# Patient Record
Sex: FEMALE | Race: WHITE | NOT HISPANIC OR LATINO | URBAN - METROPOLITAN AREA
[De-identification: names, ages, dates, MRNs, and addresses within clinical notes are randomized per-mention and may not be internally consistent; named-entity substitution may affect disease eponyms.]

---

## 2022-01-03 ENCOUNTER — LAB OUTSIDE AN ENCOUNTER (OUTPATIENT)
Dept: URBAN - METROPOLITAN AREA TELEHEALTH 2 | Facility: TELEHEALTH | Age: 52
End: 2022-01-03

## 2022-01-03 ENCOUNTER — OFFICE VISIT (OUTPATIENT)
Dept: URBAN - METROPOLITAN AREA TELEHEALTH 2 | Facility: TELEHEALTH | Age: 52
End: 2022-01-03
Payer: COMMERCIAL

## 2022-01-03 DIAGNOSIS — Z87.19 HISTORY OF ACUTE PANCREATITIS: ICD-10-CM

## 2022-01-03 DIAGNOSIS — N13.30 HYDRONEPHROSIS OF RIGHT KIDNEY: ICD-10-CM

## 2022-01-03 DIAGNOSIS — Z71.89 VACCINE COUNSELING: ICD-10-CM

## 2022-01-03 DIAGNOSIS — B18.2 CHRONIC HEPATITIS C: ICD-10-CM

## 2022-01-03 DIAGNOSIS — T86.8419 CORNEAL TRANSPLANT FAILURE, UNSPECIFIED EYE: ICD-10-CM

## 2022-01-03 DIAGNOSIS — E88.01 CARRIER OF ALPHA-1-ANTITRYPSIN DEFICIENCY: ICD-10-CM

## 2022-01-03 DIAGNOSIS — Z94.7 POST CORNEAL TRANSPLANT: ICD-10-CM

## 2022-01-03 DIAGNOSIS — Z79.899 HIGH RISK MEDICATION USE: ICD-10-CM

## 2022-01-03 DIAGNOSIS — U07.1 COVID-19: ICD-10-CM

## 2022-01-03 DIAGNOSIS — K74.00 HEPATIC FIBROSIS: ICD-10-CM

## 2022-01-03 PROCEDURE — 99204 OFFICE O/P NEW MOD 45 MIN: CPT

## 2022-01-03 RX ORDER — TIOTROPIUM BROMIDE 18 UG/1
INHALE 1 CAPSULE (18 MCG) BY INHALATION ROUTE ONCE DAILY FOR 30 DAYS CAPSULE ORAL; RESPIRATORY (INHALATION) ONCE A DAY
Refills: 0 | Status: ACTIVE | COMMUNITY
Start: 1900-01-01

## 2022-01-03 RX ORDER — MONTELUKAST SODIUM 10 MG/1
TAKE 1 TABLET (10 MG) BY ORAL ROUTE ONCE DAILY IN THE EVENING TABLET, FILM COATED ORAL 1
Qty: 0 | Refills: 0 | Status: DISCONTINUED | COMMUNITY
Start: 1900-01-01

## 2022-01-03 RX ORDER — ALBUTEROL SULFATE 90 UG/1
1 PUFF AS NEEDED AEROSOL, METERED RESPIRATORY (INHALATION)
Status: ACTIVE | COMMUNITY

## 2022-01-03 RX ORDER — FLUTICASONE PROPIONATE AND SALMETEROL 50; 500 UG/1; UG/1
INHALE 1 PUFF BY INHALATION ROUTE 2 TIMES PER DAY IN THE MORNING AND EVENING APPROXIMATELY 12 HOURS APART FOR 30 DAYS POWDER RESPIRATORY (INHALATION) 2
Qty: 1 | Refills: 0 | Status: DISCONTINUED | COMMUNITY
Start: 1900-01-01

## 2022-01-03 RX ORDER — ALBUTEROL SULFATE 2.5 MG/3ML
3 ML AS NEEDED SOLUTION RESPIRATORY (INHALATION)
Status: ACTIVE | COMMUNITY

## 2022-01-03 RX ORDER — ROFLUMILAST 500 UG/1
TAKE 1 TABLET (500 MCG) BY ORAL ROUTE ONCE DAILY TABLET ORAL 1
Qty: 0 | Refills: 0 | Status: DISCONTINUED | COMMUNITY
Start: 1900-01-01

## 2022-01-03 NOTE — HPI-TODAY'S VISIT:
This is a scheduled follow-up appointment for this patient, a 51 year old /White female, after a previous visit on Jan 2017 , for a post-treatment evaluation of hepatitis C post treatment and alpha one SZ state and here for liver disease state .   A copy of the note will be sent to the referring provider.  She lives near [place].  She has not been seeing much for 5 yrs.  Dec 2017 did rehab and she was in for alcohol and was there for a year and she had been using sig alcohol.  Alcohol none for 4 yrs.  2 yrs ago showed high cholesterol.  Last year she has had some pain right side and some concern and itching.  She did not do any drug use.  Last: Jan 30- 2017 labs wbc 4.6 hg 13.1 and plat 312 and mcv 100 slightly up. neutrophils 2.5 and lymphs 1.4. glu 93 and cr 0.7 and bun 11 and na 137 and k 4.5 and cl 98 and co2 23 and ca 91 and alb 46 and tb 0.6 and alk 79 and ast 22 and alt 11 and hcv pcr negative.   Jan 18 2017 u.s  pancreas unremarkable. Liver normal in size and contour. Bile ducts 1mm. Right kidney 11.1cm. Spleen 9.9cm. Vessels open.   She is to see dr Brambila and has had 4 respiratory issues.  She had covid 10days pre [place]. She had vomiting and fever as issues.  Dr Brambila is in charge of the post covid clinic and we can refer to see in  his clinic.  Mentioned at 2017 visit prior visit that she has copd and has been tx with reflumilast, advaid, spiriva, and singulair. Carries epipen as reacts to cat.   Prior in 2017 she has not been smoking 2.5 yrs. Stayed off since.  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   Jan 6 2016 wbc 5.2 hg 13.8 plat 406 little up, glu 96 cr 0.68, na 141 k 4,0, alb 4.4, tb 0.3 alk 76 ast 19 and alt 12 and hcv pcr neg.   Pt had hcv 2a/2c and tx sov and ravinder and did 3m tx ending dec 2014 and now here for ongoing care.   Plan: 1, Need labs. 2. Need imaging, 3. refer to Dr Brambila for the pulm  issues. 4. Plan for 2m visit and see sooner pending the tests and keep telemed visit.  Stressed to pt the need for social distancing and strict handwashing and wearing a mask and to follow any other new or added CDC recommendations as this is an evolving target.   Duration of visit 50 minutes with  20 min spent prepping chart and then 30 min from 100 to 130 pm as internet healow clock off due to internet fluxes for the telemed visit via healow with greater than 50% of time spent reviewing chart and events with pt.

## 2022-01-19 ENCOUNTER — OFFICE VISIT (OUTPATIENT)
Dept: URBAN - METROPOLITAN AREA CLINIC 79 | Facility: CLINIC | Age: 52
End: 2022-01-19
Payer: COMMERCIAL

## 2022-01-19 ENCOUNTER — TELEPHONE ENCOUNTER (OUTPATIENT)
Dept: URBAN - METROPOLITAN AREA CLINIC 92 | Facility: CLINIC | Age: 52
End: 2022-01-19

## 2022-01-19 DIAGNOSIS — B18.2 CARRIER OF VIRAL HEPATITIS C: ICD-10-CM

## 2022-01-19 PROCEDURE — 76705 ECHO EXAM OF ABDOMEN: CPT

## 2022-01-19 PROCEDURE — 93975 VASCULAR STUDY: CPT

## 2022-01-19 RX ORDER — ALBUTEROL SULFATE 90 UG/1
1 PUFF AS NEEDED AEROSOL, METERED RESPIRATORY (INHALATION)
Status: ACTIVE | COMMUNITY

## 2022-01-19 RX ORDER — ALBUTEROL SULFATE 2.5 MG/3ML
3 ML AS NEEDED SOLUTION RESPIRATORY (INHALATION)
Status: ACTIVE | COMMUNITY

## 2022-01-19 RX ORDER — TIOTROPIUM BROMIDE 18 UG/1
INHALE 1 CAPSULE (18 MCG) BY INHALATION ROUTE ONCE DAILY FOR 30 DAYS CAPSULE ORAL; RESPIRATORY (INHALATION) ONCE A DAY
Refills: 0 | Status: ACTIVE | COMMUNITY
Start: 1900-01-01

## 2022-01-19 NOTE — HPI-TODAY'S VISIT:
Dear Susanne Irizarry, January 19 ultrasound shows visualized pancreas portions were normal. There was no focal liver abnormality and the liver was normal in echogenicity. Liver vessels were patent. Gallbladder was normal. Common bile duct normal at 2 to 3 mm and right kidney normal at 9.6 cm. Spleen normal at 10.6 cm. Overall they said that the ultrasound was a normal study. Very happy to see that given how long its been since your last imaging.  Please stay on top of the liver surveillance and let us know if any issues going forward.  Thank you.  Dr Nagy

## 2022-01-25 ENCOUNTER — TELEPHONE ENCOUNTER (OUTPATIENT)
Dept: URBAN - METROPOLITAN AREA CLINIC 92 | Facility: CLINIC | Age: 52
End: 2022-01-25

## 2022-01-25 LAB
A/G RATIO: 2.1
ALBUMIN: 4.6
ALKALINE PHOSPHATASE: 59
ALT (SGPT): 8
AST (SGOT): 13
BASO (ABSOLUTE): 0.1
BASOS: 1
BILIRUBIN, TOTAL: 0.3
BUN/CREATININE RATIO: 17
BUN: 12
CALCIUM: 9.4
CARBON DIOXIDE, TOTAL: 26
CHLORIDE: 103
CREATININE: 0.72
EGFR IF AFRICN AM: 114
EGFR IF NONAFRICN AM: 99
EOS (ABSOLUTE): 0.2
EOS: 3
GLOBULIN, TOTAL: 2.2
GLUCOSE: 83
HCV LOG10: (no result)
HEMATOCRIT: 38.6
HEMATOLOGY COMMENTS:: (no result)
HEMOGLOBIN: 12.8
HEPATITIS B SURF AB QUANT: <3.1
HEPATITIS C QUANTITATION: <12
IMMATURE CELLS: (no result)
IMMATURE GRANS (ABS): 0.1
IMMATURE GRANULOCYTES: 1
LYMPHS (ABSOLUTE): 2
LYMPHS: 28
MCH: 32.2
MCHC: 33.2
MCV: 97
MONOCYTES(ABSOLUTE): 0.4
MONOCYTES: 6
NEUTROPHILS (ABSOLUTE): 4.5
NEUTROPHILS: 61
NRBC: (no result)
PLATELETS: 399
POTASSIUM: 4.6
PROTEIN, TOTAL: 6.8
RBC: 3.98
RDW: 11.7
SODIUM: 141
TEST INFORMATION:: (no result)
WBC: 7.2

## 2022-01-25 NOTE — HPI-TODAY'S VISIT:
Dear Susanne Irizarry, January 19 labs show hep B non-immune status.  You should consider getting the hep B vaccine series if you have not done that. Hep C PCR remains less than 12 and not detected. Glucose 83 BUN of 12 creatinine 0.72 sodium 141 potassium 4.6 calcium 9.4 albumin 4.6 total bilirubin 0.3 alkaline phosphatase 59 AST 13 ALT of 8.  Ideal ALT is less than 25 so you are definitely there at the ideal level. White blood cell count 7.2 hemoglobin 12.8 platelet count 399 MCV 97 and neutrophils 4.5 lymphocytes 2.0.  So these labs are all stable and in normal range. Very happy to see this. Dr. Nagy

## 2022-03-04 ENCOUNTER — OFFICE VISIT (OUTPATIENT)
Dept: URBAN - METROPOLITAN AREA TELEHEALTH 2 | Facility: TELEHEALTH | Age: 52
End: 2022-03-04
Payer: COMMERCIAL

## 2022-03-04 DIAGNOSIS — J11.1 INFLUENZA: ICD-10-CM

## 2022-03-04 DIAGNOSIS — Z87.19 HISTORY OF ACUTE PANCREATITIS: ICD-10-CM

## 2022-03-04 DIAGNOSIS — B18.2 CHRONIC HEPATITIS C: ICD-10-CM

## 2022-03-04 DIAGNOSIS — Z71.89 VACCINE COUNSELING: ICD-10-CM

## 2022-03-04 DIAGNOSIS — K74.00 HEPATIC FIBROSIS: ICD-10-CM

## 2022-03-04 DIAGNOSIS — Z79.899 HIGH RISK MEDICATION USE: ICD-10-CM

## 2022-03-04 DIAGNOSIS — E88.01 CARRIER OF ALPHA-1-ANTITRYPSIN DEFICIENCY: ICD-10-CM

## 2022-03-04 DIAGNOSIS — T86.8419 CORNEAL TRANSPLANT FAILURE, UNSPECIFIED EYE: ICD-10-CM

## 2022-03-04 DIAGNOSIS — Z94.7 POST CORNEAL TRANSPLANT: ICD-10-CM

## 2022-03-04 DIAGNOSIS — U07.1 COVID-19: ICD-10-CM

## 2022-03-04 DIAGNOSIS — N13.30 HYDRONEPHROSIS OF RIGHT KIDNEY: ICD-10-CM

## 2022-03-04 PROCEDURE — 99213 OFFICE O/P EST LOW 20 MIN: CPT

## 2022-03-04 RX ORDER — TIOTROPIUM BROMIDE 18 UG/1
INHALE 1 CAPSULE (18 MCG) BY INHALATION ROUTE ONCE DAILY FOR 30 DAYS CAPSULE ORAL; RESPIRATORY (INHALATION) ONCE A DAY
Refills: 0 | Status: ACTIVE | COMMUNITY
Start: 1900-01-01

## 2022-03-04 RX ORDER — ALBUTEROL SULFATE 90 UG/1
1 PUFF AS NEEDED AEROSOL, METERED RESPIRATORY (INHALATION)
Status: ACTIVE | COMMUNITY

## 2022-03-04 RX ORDER — MAGNESIUM HYDROXIDE 400 MG/5ML
5 ML AT LEAST 4 HOURS BETWEEN DOSES AS NEEDED SUSPENSION ORAL
Status: ACTIVE | COMMUNITY

## 2022-03-04 RX ORDER — ALBUTEROL SULFATE 2.5 MG/3ML
3 ML AS NEEDED SOLUTION RESPIRATORY (INHALATION)
Status: ACTIVE | COMMUNITY

## 2022-03-04 NOTE — HPI-TODAY'S VISIT:
This is a scheduled follow-up appointment for this patient, a 51 year old /White female, after a previous visit on Jan 2022 and prior Jan 2017 , for a post-treatment evaluation of hepatitis C post treatment and alpha one SZ state and here for liver disease state monitoring.   A copy of the note will be sent to the referring provider.  She lives near [place].  January 19 labs show hep B non-immune status.  You should consider getting the hep B vaccine series if you have not done that. Hep C PCR remains less than 12 and not detected. Glucose 83 BUN of 12 creatinine 0.72 sodium 141 potassium 4.6 calcium 9.4 albumin 4.6 total bilirubin 0.3 alkaline phosphatase 59 AST 13 ALT of 8.  Ideal ALT is less than 25 so you are definitely there at the ideal level. White blood cell count 7.2 hemoglobin 12.8 platelet count 399 MCV 97 and neutrophils 4.5 lymphocytes 2.0.  So these labs are all stable and in normal range.  January 19 ultrasound shows visualized pancreas portions were normal. There was no focal liver abnormality and the liver was normal in echogenicity. Liver vessels were patent. Gallbladder was normal. Common bile duct normal at 2 to 3 mm and right kidney normal at 9.6 cm. Spleen normal at 10.6 cm. Overall they said that the ultrasound was a normal study.  She has not been seeing much for 4-5 yrs.  Dec 2017 did rehab and she was in for alcohol and was there for a year and she had been using sig alcohol.  Alcohol none for 4 yrs.  2 yrs ago showed high cholesterol.  She did not do any drug use.  She had the flu and was 3 weeks to get over it and in 14 m she has 4-5 resp functions and to see Dr Brambila on March 2022.  Jan 30- 2017 labs wbc 4.6 hg 13.1 and plat 312 and mcv 100 slightly up. neutrophils 2.5 and lymphs 1.4. glu 93 and cr 0.7 and bun 11 and na 137 and k 4.5 and cl 98 and co2 23 and ca 91 and alb 46 and tb 0.6 and alk 79 and ast 22 and alt 11 and hcv pcr negative.   Jan 18 2017 u.s  pancreas unremarkable. Liver normal in size and contour. Bile ducts 1mm. Right kidney 11.1cm. Spleen 9.9cm. Vessels open.   She had covid 10days pre [place] 2021 She had vomiting and fever as issues.  Had a do a few courses of abx to get over the flu.  Mentioned at 2017 visit prior visit that she has copd and has been tx with reflumilast, advaid, spiriva, and singulair. Carries epipen as reacts to cat.   Prior in 2017 she has not been smoking 2.5 yrs. Stayed off since.  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   Jan 6 2016 wbc 5.2 hg 13.8 plat 406 little up, glu 96 cr 0.68, na 141 k 4,0, alb 4.4, tb 0.3 alk 76 ast 19 and alt 12 and hcv pcr neg.   Pt had hcv 2a/2c and tx sov and ravinder and did 3m tx ending dec 2014 and now here for ongoing care.   Plan: 1. Need labs and u.s in 6m. 2. Stay with Dr Brambila for her lung issues 3. RTC in 6m.   Stressed to pt the need for social distancing and strict handwashing and wearing a mask and to follow any other new or added CDC recommendations as this is an evolving target.   Duration of visit 30 minutes with  5 min spent prepping chart and then 18 min by edison with greater than 50% of time spent reviewing chart and events with pt.

## 2022-08-22 ENCOUNTER — LAB OUTSIDE AN ENCOUNTER (OUTPATIENT)
Dept: URBAN - METROPOLITAN AREA TELEHEALTH 2 | Facility: TELEHEALTH | Age: 52
End: 2022-08-22

## 2022-08-24 ENCOUNTER — OFFICE VISIT (OUTPATIENT)
Dept: URBAN - METROPOLITAN AREA CLINIC 79 | Facility: CLINIC | Age: 52
End: 2022-08-24
Payer: COMMERCIAL

## 2022-08-24 DIAGNOSIS — K76.0 FATTY (CHANGE OF) LIVER: ICD-10-CM

## 2022-08-24 DIAGNOSIS — B18.2 CARRIER OF VIRAL HEPATITIS C: ICD-10-CM

## 2022-08-24 PROCEDURE — 76705 ECHO EXAM OF ABDOMEN: CPT | Performed by: INTERNAL MEDICINE

## 2022-08-24 PROCEDURE — 93975 VASCULAR STUDY: CPT | Performed by: INTERNAL MEDICINE

## 2022-08-24 RX ORDER — ALBUTEROL SULFATE 90 UG/1
1 PUFF AS NEEDED AEROSOL, METERED RESPIRATORY (INHALATION)
Status: ACTIVE | COMMUNITY

## 2022-08-24 RX ORDER — MAGNESIUM HYDROXIDE 400 MG/5ML
5 ML AT LEAST 4 HOURS BETWEEN DOSES AS NEEDED SUSPENSION ORAL
Status: ACTIVE | COMMUNITY

## 2022-08-24 RX ORDER — ALBUTEROL SULFATE 2.5 MG/3ML
3 ML AS NEEDED SOLUTION RESPIRATORY (INHALATION)
Status: ACTIVE | COMMUNITY

## 2022-08-24 RX ORDER — TIOTROPIUM BROMIDE 18 UG/1
INHALE 1 CAPSULE (18 MCG) BY INHALATION ROUTE ONCE DAILY FOR 30 DAYS CAPSULE ORAL; RESPIRATORY (INHALATION) ONCE A DAY
Refills: 0 | Status: ACTIVE | COMMUNITY
Start: 1900-01-01

## 2022-08-26 ENCOUNTER — LAB OUTSIDE AN ENCOUNTER (OUTPATIENT)
Dept: URBAN - METROPOLITAN AREA CLINIC 128 | Facility: CLINIC | Age: 52
End: 2022-08-26

## 2022-08-26 ENCOUNTER — WEB ENCOUNTER (OUTPATIENT)
Dept: URBAN - METROPOLITAN AREA CLINIC 128 | Facility: CLINIC | Age: 52
End: 2022-08-26

## 2022-08-26 ENCOUNTER — OFFICE VISIT (OUTPATIENT)
Dept: URBAN - METROPOLITAN AREA CLINIC 128 | Facility: CLINIC | Age: 52
End: 2022-08-26
Payer: COMMERCIAL

## 2022-08-26 VITALS
HEART RATE: 82 BPM | BODY MASS INDEX: 19.4 KG/M2 | TEMPERATURE: 97.8 F | HEIGHT: 69 IN | WEIGHT: 131 LBS | SYSTOLIC BLOOD PRESSURE: 138 MMHG | DIASTOLIC BLOOD PRESSURE: 73 MMHG

## 2022-08-26 DIAGNOSIS — R13.10 DYSPHAGIA: ICD-10-CM

## 2022-08-26 DIAGNOSIS — Z12.11 COLON CANCER SCREENING: ICD-10-CM

## 2022-08-26 DIAGNOSIS — K59.01 CONSTIPATION: ICD-10-CM

## 2022-08-26 PROBLEM — 40739000 DYSPHAGIA: Status: ACTIVE | Noted: 2022-08-26

## 2022-08-26 PROBLEM — 14760008 CONSTIPATION: Status: ACTIVE | Noted: 2022-08-26

## 2022-08-26 PROCEDURE — 99214 OFFICE O/P EST MOD 30 MIN: CPT | Performed by: INTERNAL MEDICINE

## 2022-08-26 RX ORDER — TIOTROPIUM BROMIDE 18 UG/1
INHALE 1 CAPSULE (18 MCG) BY INHALATION ROUTE ONCE DAILY FOR 30 DAYS CAPSULE ORAL; RESPIRATORY (INHALATION) ONCE A DAY
Refills: 0 | Status: DISCONTINUED | COMMUNITY
Start: 1900-01-01

## 2022-08-26 RX ORDER — ALBUTEROL SULFATE 2.5 MG/3ML
3 ML AS NEEDED SOLUTION RESPIRATORY (INHALATION)
Status: DISCONTINUED | COMMUNITY

## 2022-08-26 RX ORDER — ALBUTEROL SULFATE 90 UG/1
1 PUFF AS NEEDED AEROSOL, METERED RESPIRATORY (INHALATION)
Status: ACTIVE | COMMUNITY

## 2022-08-26 RX ORDER — MAGNESIUM HYDROXIDE 400 MG/5ML
5 ML AT LEAST 4 HOURS BETWEEN DOSES AS NEEDED SUSPENSION ORAL
Status: DISCONTINUED | COMMUNITY

## 2022-08-26 RX ORDER — LINACLOTIDE 72 UG/1
1 CAPSULE AT LEAST 30 MINUTES BEFORE THE FIRST MEAL OF THE DAY ON AN EMPTY STOMACH CAPSULE, GELATIN COATED ORAL ONCE A DAY
Qty: 30 | Refills: 3 | OUTPATIENT
Start: 2022-08-26 | End: 2022-12-23

## 2022-08-26 RX ORDER — POLYETHYLENE GLYOCOL 3350, SODIUM CHLORIDE, SODIUM BICARBONATE AND POTASSIUM CHLORIDE 420; 11.2; 5.72; 1.48 G/4L; G/4L; G/4L; G/4L
AS DIRECTED POWDER, FOR SOLUTION NASOGASTRIC; ORAL
Qty: 1 | Refills: 0 | OUTPATIENT
Start: 2022-08-26 | End: 2022-08-27

## 2022-08-26 NOTE — HPI-TODAY'S VISIT:
patient not seen by me in over 10 years. has been under Dr. Nagy' care for hep c which has been eradicated. reports chronic constiaption since getting sober in 2017. fiber and senna no longer effective. also reporst dysphagia to steak with some n v though has had to bring it back up from her chest. MBS by report showed some reflux and poss aspiration.

## 2022-08-27 ENCOUNTER — TELEPHONE ENCOUNTER (OUTPATIENT)
Dept: URBAN - METROPOLITAN AREA CLINIC 92 | Facility: CLINIC | Age: 52
End: 2022-08-27

## 2022-08-27 NOTE — HPI-TODAY'S VISIT:
All Irizarry, August 24 ultrasound shows partially visualized pancreas is unremarkable. Liver was homogeneous in echotexture and normal in echogenicity with no focal mass. Gallbladder thin-walled without any stones.  Common bile duct normal at 4 mm. Right kidney 10.4 cm with no hydronephrosis.  Spleen normal 9.8 cm. Liver vessels patent. Overall they found no evidence of any focal mass lesion and a normal echogenicity to the liver. Dr. Nagy

## 2022-08-31 LAB — TSH: 1.18

## 2022-09-02 ENCOUNTER — LAB OUTSIDE AN ENCOUNTER (OUTPATIENT)
Dept: URBAN - METROPOLITAN AREA TELEHEALTH 2 | Facility: TELEHEALTH | Age: 52
End: 2022-09-02

## 2022-09-02 ENCOUNTER — TELEPHONE ENCOUNTER (OUTPATIENT)
Dept: URBAN - METROPOLITAN AREA CLINIC 128 | Facility: CLINIC | Age: 52
End: 2022-09-02

## 2022-09-02 ENCOUNTER — OFFICE VISIT (OUTPATIENT)
Dept: URBAN - METROPOLITAN AREA TELEHEALTH 2 | Facility: TELEHEALTH | Age: 52
End: 2022-09-02
Payer: COMMERCIAL

## 2022-09-02 VITALS — HEIGHT: 69 IN | BODY MASS INDEX: 19.26 KG/M2 | WEIGHT: 130 LBS

## 2022-09-02 DIAGNOSIS — R20.2 TINGLING IN EXTREMITIES: ICD-10-CM

## 2022-09-02 DIAGNOSIS — B18.2 CHRONIC HEPATITIS C: ICD-10-CM

## 2022-09-02 DIAGNOSIS — U07.1 COVID-19: ICD-10-CM

## 2022-09-02 DIAGNOSIS — T86.8419 CORNEAL TRANSPLANT FAILURE, UNSPECIFIED EYE: ICD-10-CM

## 2022-09-02 DIAGNOSIS — E88.01 CARRIER OF ALPHA-1-ANTITRYPSIN DEFICIENCY: ICD-10-CM

## 2022-09-02 DIAGNOSIS — R11.10 REGURGITATION: ICD-10-CM

## 2022-09-02 DIAGNOSIS — J11.1 INFLUENZA: ICD-10-CM

## 2022-09-02 DIAGNOSIS — Z94.7 POST CORNEAL TRANSPLANT: ICD-10-CM

## 2022-09-02 DIAGNOSIS — N13.30 HYDRONEPHROSIS OF RIGHT KIDNEY: ICD-10-CM

## 2022-09-02 DIAGNOSIS — K74.00 HEPATIC FIBROSIS: ICD-10-CM

## 2022-09-02 PROCEDURE — 99214 OFFICE O/P EST MOD 30 MIN: CPT

## 2022-09-02 RX ORDER — ALBUTEROL SULFATE 90 UG/1
1 PUFF AS NEEDED AEROSOL, METERED RESPIRATORY (INHALATION)
Status: ACTIVE | COMMUNITY

## 2022-09-02 RX ORDER — LINACLOTIDE 72 UG/1
2 CAPSULE AT LEAST 30 MINUTES BEFORE THE FIRST MEAL OF THE DAY ON AN EMPTY STOMACH CAPSULE, GELATIN COATED ORAL ONCE A DAY
Refills: 3 | Status: ACTIVE | COMMUNITY
Start: 2022-08-26 | End: 2022-12-30

## 2022-09-02 RX ORDER — FLUTICASONE FUROATE, UMECLIDINIUM BROMIDE AND VILANTEROL TRIFENATATE 100; 62.5; 25 UG/1; UG/1; UG/1
1 PUFF POWDER RESPIRATORY (INHALATION) ONCE A DAY
Status: ACTIVE | COMMUNITY

## 2022-09-02 NOTE — HPI-TODAY'S VISIT:
This is a scheduled follow-up appointment for this patient, a 51 year old /White female, after a previous visit on March 2022 for a post-treatment evaluation of hepatitis C post treatment and alpha one SZ state and here for liver disease state monitoring.   A copy of the note will be sent to the referring provider.  She lives near [place].  August 24 ultrasound shows partially visualized pancreas is unremarkable. Liver was homogeneous in echotexture and normal in echogenicity with no focal mass. Gallbladder thin-walled without any stones.  Common bile duct normal at 4 mm. Right kidney 10.4 cm with no hydronephrosis.  Spleen normal 9.8 cm. Liver vessels patent. Overall they found no evidence of any focal mass lesion and a normal echogenicity to the liver.  She did not do any labs.  April not been well since. She is to do egd with DR Nguyen to look at regurgitation. ALso on linzess for her bowels.  January 19 labs show hep B non-immune status.  You should consider getting the hep B vaccine series if you have not done that yet. Could try the heplisav as failed the engerix prior. Hep C PCR remains less than 12 and not detected. Glucose 83 BUN of 12 creatinine 0.72 sodium 141 potassium 4.6 calcium 9.4 albumin 4.6 total bilirubin 0.3 alkaline phosphatase 59 AST 13 ALT of 8.  Ideal ALT is less than 25 so you are definitely there at the ideal level. White blood cell count 7.2 hemoglobin 12.8 platelet count 399 MCV 97 and neutrophils 4.5 lymphocytes 2.0.  So these labs are all stable and in normal range.  January 19 ultrasound shows visualized pancreas portions were normal. There was no focal liver abnormality and the liver was normal in echogenicity. Liver vessels were patent. Gallbladder was normal. Common bile duct normal at 2 to 3 mm and right kidney normal at 9.6 cm. Spleen normal at 10.6 cm. Overall they said that the ultrasound was a normal study.  Dec 2017 did rehab and she was in for alcohol and was there for a year and she had been using sig alcohol.  Alcohol none for 4 yrs.  2 yrs ago showed high cholesterol.  She did not do any drug use.  She had the flu and was 3 weeks to get over it and in 14 m she has 4-5 resp functions and to see Dr Brambila on March 2022.  Jan 30- 2017 labs wbc 4.6 hg 13.1 and plat 312 and mcv 100 slightly up. neutrophils 2.5 and lymphs 1.4. glu 93 and cr 0.7 and bun 11 and na 137 and k 4.5 and cl 98 and co2 23 and ca 91 and alb 46 and tb 0.6 and alk 79 and ast 22 and alt 11 and hcv pcr negative.   Jan 18 2017 u.s  pancreas unremarkable. Liver normal in size and contour. Bile ducts 1mm. Right kidney 11.1cm. Spleen 9.9cm. Vessels open.   She had covid 10days pre [place] 2021 She had vomiting and fever as issues.  Had a do a few courses of abx to get over the flu.  Mentioned at 2017 visit prior visit that she has copd and has been tx with reflumilast, advaid, spiriva, and singulair. Carries epipen as reacts to cat.   Prior in 2017 she has not been smoking 2.5 yrs. Stayed off since.  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   Jan 6 2016 wbc 5.2 hg 13.8 plat 406 little up, glu 96 cr 0.68, na 141 k 4,0, alb 4.4, tb 0.3 alk 76 ast 19 and alt 12 and hcv pcr neg.   Pt had hcv 2a/2c and tx sov and ravinder and did 3m tx ending dec 2014 and now here for ongoing care.   Plan: 1. Need labs now and do the labs again and u.s in 6m. 2. Sees with Dr Brambila for her lung issues. 3. She sees DR Nguyen for gi issues. 4. RTC in 6m.   Stressed to pt the need for social distancing and strict handwashing and wearing a mask and to follow any other new or added CDC recommendations as this is an evolving target.   Duration of visit 30 minutes with  10 min spent prepping chart and then 20  min from 122 to 142 pm by clock today as healow clock off with greater than 50% of time spent reviewing chart and events with pt.

## 2022-09-03 PROBLEM — 47717004 ABNORMAL DEGLUTITION: Status: ACTIVE | Noted: 2022-09-03

## 2022-09-06 ENCOUNTER — LAB OUTSIDE AN ENCOUNTER (OUTPATIENT)
Dept: URBAN - METROPOLITAN AREA CLINIC 128 | Facility: CLINIC | Age: 52
End: 2022-09-06

## 2022-09-13 ENCOUNTER — OFFICE VISIT (OUTPATIENT)
Dept: URBAN - METROPOLITAN AREA SURGERY CENTER 31 | Facility: SURGERY CENTER | Age: 52
End: 2022-09-13
Payer: COMMERCIAL

## 2022-09-13 ENCOUNTER — LAB OUTSIDE AN ENCOUNTER (OUTPATIENT)
Dept: URBAN - METROPOLITAN AREA SURGERY CENTER 31 | Facility: SURGERY CENTER | Age: 52
End: 2022-09-13

## 2022-09-13 DIAGNOSIS — R13.10 ABNORMAL DEGLUTITION: ICD-10-CM

## 2022-09-13 DIAGNOSIS — R68.81 EARLY SATIETY: ICD-10-CM

## 2022-09-13 DIAGNOSIS — K31.7 BENIGN GASTRIC POLYP: ICD-10-CM

## 2022-09-13 DIAGNOSIS — K31.89 ACQUIRED DEFORMITY OF DUODENUM: ICD-10-CM

## 2022-09-13 PROCEDURE — G8907 PT DOC NO EVENTS ON DISCHARG: HCPCS | Performed by: INTERNAL MEDICINE

## 2022-09-13 PROCEDURE — 43239 EGD BIOPSY SINGLE/MULTIPLE: CPT | Performed by: INTERNAL MEDICINE

## 2022-09-13 RX ORDER — FLUTICASONE FUROATE, UMECLIDINIUM BROMIDE AND VILANTEROL TRIFENATATE 100; 62.5; 25 UG/1; UG/1; UG/1
1 PUFF POWDER RESPIRATORY (INHALATION) ONCE A DAY
Status: ACTIVE | COMMUNITY

## 2022-09-13 RX ORDER — LINACLOTIDE 72 UG/1
2 CAPSULE AT LEAST 30 MINUTES BEFORE THE FIRST MEAL OF THE DAY ON AN EMPTY STOMACH CAPSULE, GELATIN COATED ORAL ONCE A DAY
Refills: 3 | Status: ACTIVE | COMMUNITY
Start: 2022-08-26 | End: 2022-12-30

## 2022-09-13 RX ORDER — LINACLOTIDE 145 UG/1
1 CAPSULE AT LEAST 30 MINUTES BEFORE THE FIRST MEAL OF THE DAY ON AN EMPTY STOMACH CAPSULE, GELATIN COATED ORAL ONCE A DAY
Qty: 30 CAPSULE | Refills: 3 | OUTPATIENT
Start: 2022-09-13 | End: 2023-01-10

## 2022-09-13 RX ORDER — ALBUTEROL SULFATE 90 UG/1
1 PUFF AS NEEDED AEROSOL, METERED RESPIRATORY (INHALATION)
Status: ACTIVE | COMMUNITY

## 2022-09-21 ENCOUNTER — LAB OUTSIDE AN ENCOUNTER (OUTPATIENT)
Dept: URBAN - METROPOLITAN AREA CLINIC 128 | Facility: CLINIC | Age: 52
End: 2022-09-21

## 2022-09-28 ENCOUNTER — TELEPHONE ENCOUNTER (OUTPATIENT)
Dept: URBAN - METROPOLITAN AREA CLINIC 128 | Facility: CLINIC | Age: 52
End: 2022-09-28

## 2022-09-28 ENCOUNTER — LAB OUTSIDE AN ENCOUNTER (OUTPATIENT)
Dept: URBAN - METROPOLITAN AREA CLINIC 128 | Facility: CLINIC | Age: 52
End: 2022-09-28

## 2022-09-28 PROBLEM — 15930421000119108: Status: ACTIVE | Noted: 2022-09-28

## 2022-10-25 ENCOUNTER — OFFICE VISIT (OUTPATIENT)
Dept: URBAN - METROPOLITAN AREA SURGERY CENTER 31 | Facility: SURGERY CENTER | Age: 52
End: 2022-10-25
Payer: COMMERCIAL

## 2022-10-25 ENCOUNTER — TELEPHONE ENCOUNTER (OUTPATIENT)
Dept: URBAN - METROPOLITAN AREA CLINIC 128 | Facility: CLINIC | Age: 52
End: 2022-10-25

## 2022-10-25 DIAGNOSIS — Z12.11 COLON CANCER SCREENING: ICD-10-CM

## 2022-10-25 PROCEDURE — 45378 DIAGNOSTIC COLONOSCOPY: CPT | Performed by: INTERNAL MEDICINE

## 2022-10-25 PROCEDURE — G8907 PT DOC NO EVENTS ON DISCHARG: HCPCS | Performed by: INTERNAL MEDICINE

## 2022-10-25 RX ORDER — LINACLOTIDE 145 UG/1
1 CAPSULE AT LEAST 30 MINUTES BEFORE THE FIRST MEAL OF THE DAY ON AN EMPTY STOMACH CAPSULE, GELATIN COATED ORAL ONCE A DAY
Qty: 30 CAPSULE | Refills: 3 | Status: ACTIVE | COMMUNITY
Start: 2022-09-13 | End: 2023-01-10

## 2022-10-25 RX ORDER — FLUTICASONE FUROATE, UMECLIDINIUM BROMIDE AND VILANTEROL TRIFENATATE 100; 62.5; 25 UG/1; UG/1; UG/1
1 PUFF POWDER RESPIRATORY (INHALATION) ONCE A DAY
Status: ACTIVE | COMMUNITY

## 2022-10-25 RX ORDER — ALBUTEROL SULFATE 90 UG/1
1 PUFF AS NEEDED AEROSOL, METERED RESPIRATORY (INHALATION)
Status: ACTIVE | COMMUNITY

## 2022-10-25 RX ORDER — LINACLOTIDE 72 UG/1
2 CAPSULE AT LEAST 30 MINUTES BEFORE THE FIRST MEAL OF THE DAY ON AN EMPTY STOMACH CAPSULE, GELATIN COATED ORAL ONCE A DAY
Refills: 3 | Status: ACTIVE | COMMUNITY
Start: 2022-08-26 | End: 2022-12-30

## 2022-11-02 ENCOUNTER — OFFICE VISIT (OUTPATIENT)
Dept: URBAN - METROPOLITAN AREA CLINIC 128 | Facility: CLINIC | Age: 52
End: 2022-11-02

## 2022-11-15 ENCOUNTER — TELEPHONE ENCOUNTER (OUTPATIENT)
Dept: URBAN - METROPOLITAN AREA CLINIC 128 | Facility: CLINIC | Age: 52
End: 2022-11-15

## 2023-01-10 PROBLEM — 197321007 FATTY LIVER: Status: ACTIVE | Noted: 2023-01-10

## 2023-01-26 ENCOUNTER — LAB OUTSIDE AN ENCOUNTER (OUTPATIENT)
Dept: URBAN - METROPOLITAN AREA CLINIC 86 | Facility: CLINIC | Age: 53
End: 2023-01-26

## 2023-02-04 ENCOUNTER — TELEPHONE ENCOUNTER (OUTPATIENT)
Dept: URBAN - METROPOLITAN AREA CLINIC 92 | Facility: CLINIC | Age: 53
End: 2023-02-04

## 2023-02-04 LAB
A/G RATIO: 2
ABSOLUTE BASOPHILS: 41
ABSOLUTE EOSINOPHILS: 338
ABSOLUTE LYMPHOCYTES: 1815
ABSOLUTE MONOCYTES: 469
ABSOLUTE NEUTROPHILS: 4237
ALBUMIN: 4.7
ALKALINE PHOSPHATASE: 64
ALT (SGPT): 9
AST (SGOT): 14
BASOPHILS: 0.6
BILIRUBIN, TOTAL: 0.7
BUN/CREATININE RATIO: (no result)
BUN: 15
CALCIUM: 9.8
CARBON DIOXIDE, TOTAL: 28
CHLORIDE: 100
COMMENT: (no result)
CREATININE: 0.67
EGFR: 106
EOSINOPHILS: 4.9
GLOBULIN, TOTAL: 2.4
GLUCOSE: 99
HCV RNA, QUANTITATIVE REAL TIME PCR: (no result)
HCV RNA, QUANTITATIVE REAL TIME PCR: (no result)
HEMATOCRIT: 38.6
HEMOGLOBIN: 13.1
HEPATITIS B SURFACE ANTIGEN: (no result)
HEPATITIS C ANTIBODY: REACTIVE
LYMPHOCYTES: 26.3
MCH: 34
MCHC: 33.9
MCV: 100.3
MONOCYTES: 6.8
MPV: 10.3
NEUTROPHILS: 61.4
PLATELET COUNT: 404
POTASSIUM: 4.4
PROTEIN, TOTAL: 7.1
RDW: 11.6
RED BLOOD CELL COUNT: 3.85
REFLEX TIQ: (no result)
SIGNAL TO CUT-OFF: >11
SODIUM: 135
WHITE BLOOD CELL COUNT: 6.9

## 2023-02-04 NOTE — HPI-TODAY'S VISIT:
Dear Susanne Irizarry, January 26 labs show glucose 99 BUN of 15 creatinine 0.67 sodium 135 potassium 4.4 albumin 4.7 bili 0.7 alk phos 64 AST 14 ALT of 9.  Ideal ALT less than 25. WBC normal at 6.9 hemoglobin normal at 13.1 but MCV elevated at 100.3.  May want to check your B12 and folate levels with your primary provider. Platelet count curiously also up to 404 and was previously normal.  Have you been ill recently?  Neutrophils were normal at 4237 lymphocytes normal at 1815. We ordered a hep C PCR but they are now many labs doing the hep C antibody first which is expected came back positive but PCR was not detected. Hep B surface antigen was noted to be negative as well. Good to check as rarely can reactivate with the immune clearance events of treatment of the hep c. We look forward to seeing you at the next visit. Please share labs with local providers. Dr. Nagy

## 2023-02-08 ENCOUNTER — CLAIMS CREATED FROM THE CLAIM WINDOW (OUTPATIENT)
Dept: URBAN - METROPOLITAN AREA CLINIC 79 | Facility: CLINIC | Age: 53
End: 2023-02-08
Payer: COMMERCIAL

## 2023-02-08 ENCOUNTER — TELEPHONE ENCOUNTER (OUTPATIENT)
Dept: URBAN - METROPOLITAN AREA CLINIC 92 | Facility: CLINIC | Age: 53
End: 2023-02-08

## 2023-02-08 DIAGNOSIS — B18.2 CHRONIC HEPATITIS C: ICD-10-CM

## 2023-02-08 DIAGNOSIS — K74.00 HEPATIC FIBROSIS: ICD-10-CM

## 2023-02-08 PROCEDURE — INT INTEREST PAYMENT

## 2023-02-08 PROCEDURE — 93975 VASCULAR STUDY: CPT

## 2023-02-08 PROCEDURE — 76705 ECHO EXAM OF ABDOMEN: CPT

## 2023-02-08 RX ORDER — ALBUTEROL SULFATE 90 UG/1
1 PUFF AS NEEDED AEROSOL, METERED RESPIRATORY (INHALATION)
Status: ACTIVE | COMMUNITY

## 2023-02-08 RX ORDER — FLUTICASONE FUROATE, UMECLIDINIUM BROMIDE AND VILANTEROL TRIFENATATE 100; 62.5; 25 UG/1; UG/1; UG/1
1 PUFF POWDER RESPIRATORY (INHALATION) ONCE A DAY
Status: ACTIVE | COMMUNITY

## 2023-02-08 NOTE — HPI-TODAY'S VISIT:
All Irizarry, Feb 8 u.s: Partially visualized pancreas was unremarkable. Mildly increased echogenicity seen of the liver but without overt evidence of cirrhosis and no focal mass. Gallbladder with no stones or pericholecystic fluid. Common bile duct normal at 3 mm. Right kidney normal at 9.7 cm with no hydronephrosis. Spleen normal at 9.2 cm. Liver vessels patent. Overall the liver appears to be mildly increased in echogenicity but no overt cirrhosis or focal lesions. Normal liver Dopplers noted. Dr. Nagy

## 2023-02-15 ENCOUNTER — OFFICE VISIT (OUTPATIENT)
Dept: URBAN - METROPOLITAN AREA TELEHEALTH 2 | Facility: TELEHEALTH | Age: 53
End: 2023-02-15
Payer: COMMERCIAL

## 2023-02-15 VITALS — HEIGHT: 69 IN | WEIGHT: 137 LBS | BODY MASS INDEX: 20.29 KG/M2

## 2023-02-15 DIAGNOSIS — K74.00 HEPATIC FIBROSIS: ICD-10-CM

## 2023-02-15 DIAGNOSIS — N13.30 HYDRONEPHROSIS OF RIGHT KIDNEY: ICD-10-CM

## 2023-02-15 DIAGNOSIS — R11.10 REGURGITATION: ICD-10-CM

## 2023-02-15 DIAGNOSIS — B18.2 CHRONIC HEPATITIS C: ICD-10-CM

## 2023-02-15 PROBLEM — 30188007 ALPHA-1-ANTITRYPSIN DEFICIENCY: Status: ACTIVE | Noted: 2021-12-31

## 2023-02-15 PROBLEM — 80910005 SKIN SENSATION DISTURBANCE: Status: ACTIVE | Noted: 2022-09-02

## 2023-02-15 PROBLEM — 62484002 HEPATIC FIBROSIS: Status: ACTIVE | Noted: 2021-12-31

## 2023-02-15 PROBLEM — 840539006 COVID-19: Status: ACTIVE | Noted: 2022-01-03

## 2023-02-15 PROBLEM — 128302006 CHRONIC HEPATITIS C: Status: ACTIVE | Noted: 2021-12-31

## 2023-02-15 PROBLEM — 266997008 HISTORY OF GASTROINTESTINAL DISEASE: Status: ACTIVE | Noted: 2021-12-31

## 2023-02-15 PROBLEM — 43064006 HYDRONEPHROSIS: Status: ACTIVE | Noted: 2021-12-31

## 2023-02-15 PROBLEM — 6142004 INFLUENZA: Status: ACTIVE | Noted: 2022-03-04

## 2023-02-15 PROBLEM — 409063005 COUNSELING: Status: ACTIVE | Noted: 2021-12-31

## 2023-02-15 PROBLEM — 161646004 H/O: HIGH RISK MEDICATION: Status: ACTIVE | Noted: 2021-12-31

## 2023-02-15 PROBLEM — 263854006 REGURGITATION: Status: ACTIVE | Noted: 2022-09-02

## 2023-02-15 PROCEDURE — 99214 OFFICE O/P EST MOD 30 MIN: CPT

## 2023-02-15 RX ORDER — ALBUTEROL SULFATE 90 UG/1
1 PUFF AS NEEDED AEROSOL, METERED RESPIRATORY (INHALATION)
Status: ACTIVE | COMMUNITY

## 2023-02-15 RX ORDER — FLUTICASONE FUROATE, UMECLIDINIUM BROMIDE AND VILANTEROL TRIFENATATE 100; 62.5; 25 UG/1; UG/1; UG/1
1 PUFF POWDER RESPIRATORY (INHALATION) ONCE A DAY
Status: ACTIVE | COMMUNITY

## 2023-02-15 NOTE — HPI-TODAY'S VISIT:
This is a scheduled follow-up appointment for this patient, a 52 year old /White female, after a previous visit on Sept 2022 for a post-treatment evaluation of hepatitis C post treatment and alpha one SZ state and here for liver disease state monitoring.   A copy of the note will be sent to the referring provider.  She lives near [place].  Feb 8 u.s: Partially visualized pancreas was unremarkable. Mildly increased echogenicity seen of the liver but without overt evidence of cirrhosis and no focal mass. Gallbladder with no stones or pericholecystic fluid. Common bile duct normal at 3 mm. Right kidney normal at 9.7 cm with no hydronephrosis. Spleen normal at 9.2 cm. Liver vessels patent. Overall the liver appears to be mildly increased in echogenicity but no overt cirrhosis or focal lesions. Normal liver Dopplers noted.  January 26 labs show glucose 99 BUN of 15 creatinine 0.67 sodium 135 potassium 4.4 albumin 4.7 bili 0.7 alk phos 64 AST 14 ALT of 9.  Ideal ALT less than 25. WBC normal at 6.9 hemoglobin normal at 13.1 but MCV elevated at 100.3.  May want to check your B12 and folate levels with your primary provider. Platelet count curiously also up to 404 and was previously normal.  Have you been ill recently?  Neutrophils were normal at 4237 lymphocytes normal at 1815. She has a sinus issue. Seeing ent soon. Dr Brambila said septum. We ordered a hep C PCR but they are now many labs doing the hep C antibody first which is expected came back positive but PCR was not detected. Hep B surface antigen was noted to be negative as well. Good to check as rarely can reactivate with the immune clearance events of treatment of the hep c.  She has gained weight and so that may add to the inc echogencity and so that is issue.   Has carpal tunnel in hand and using nsaids.  August 24 ultrasound shows partially visualized pancreas is unremarkable. Liver was homogeneous in echotexture and normal in echogenicity with no focal mass. Gallbladder thin-walled without any stones.  Common bile duct normal at 4 mm. Right kidney 10.4 cm with no hydronephrosis.  Spleen normal 9.8 cm. Liver vessels patent. Overall they found no evidence of any focal mass lesion and a normal echogenicity to the liver.  April not been well since. She did do the egd with DR Nguyen to look at regurgitation and she had HH and some polyps. Did colon also and was ok. Trying to get linzess for her bowels.  January 19 labs show hep B non-immune status.  You should consider getting the hep B vaccine series if you have not done that yet. Could try the heplisav as failed the engerix prior. Hep C PCR remains less than 12 and not detected. Glucose 83 BUN of 12 creatinine 0.72 sodium 141 potassium 4.6 calcium 9.4 albumin 4.6 total bilirubin 0.3 alkaline phosphatase 59 AST 13 ALT of 8.  Ideal ALT is less than 25 so you are definitely there at the ideal level. White blood cell count 7.2 hemoglobin 12.8 platelet count 399 MCV 97 and neutrophils 4.5 lymphocytes 2.0.  So these labs are all stable and in normal range.  January 19 ultrasound shows visualized pancreas portions were normal. There was no focal liver abnormality and the liver was normal in echogenicity. Liver vessels were patent. Gallbladder was normal. Common bile duct normal at 2 to 3 mm and right kidney normal at 9.6 cm. Spleen normal at 10.6 cm. Overall they said that the ultrasound was a normal study.  Dec 2017 did rehab and she was in for alcohol and was there for a year and she had been using sig alcohol.  Alcohol none for 4 yrs.  2 yrs ago showed high cholesterol.  She did not do any drug use.  She had the flu and was 3 weeks to get over it and in 14 m she has 4-5 resp functions and to see Dr Brambila on March 2022.  Jan 30- 2017 labs wbc 4.6 hg 13.1 and plat 312 and mcv 100 slightly up. neutrophils 2.5 and lymphs 1.4. glu 93 and cr 0.7 and bun 11 and na 137 and k 4.5 and cl 98 and co2 23 and ca 91 and alb 46 and tb 0.6 and alk 79 and ast 22 and alt 11 and hcv pcr negative.   Jan 18 2017 u.s  pancreas unremarkable. Liver normal in size and contour. Bile ducts 1mm. Right kidney 11.1cm. Spleen 9.9cm. Vessels open.   She had covid 10days pre [place] 2021 She had vomiting and fever as issues.  Had a do a few courses of abx to get over the flu.  Mentioned at 2017 visit prior visit that she has copd and has been tx with reflumilast, advaid, spiriva, and singulair. Carries epipen as reacts to cat.   Prior in 2017 she has not been smoking 2.5 yrs. Stayed off since.  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   Jan 6 2016 wbc 5.2 hg 13.8 plat 406 little up, glu 96 cr 0.68, na 141 k 4,0, alb 4.4, tb 0.3 alk 76 ast 19 and alt 12 and hcv pcr neg.   Pt had hcv 2a/2c and tx sov and ravinder and did 3m tx ending dec 2014 and now here for ongoing care.   Plan: 1. Need labs and the u.s in 6m. Weight is adding to the liver 2. Sees with Dr Brambila for her lung issues and to ent. 3. She did the hep b vaccine and check next time. 4, RTC in 6m.   Stressed to pt the need for social distancing and strict handwashing and wearing a mask and to follow any other new or added CDC recommendations as this is an evolving target.   Duration of visit 30 minutes with  10 min spent prepping chart and then 20  min from 121 to 141 by clock as healow clock off due to internet fluxes with greater than 50% of time spent reviewing chart and events with pt.

## 2023-02-27 ENCOUNTER — LAB OUTSIDE AN ENCOUNTER (OUTPATIENT)
Dept: URBAN - METROPOLITAN AREA TELEHEALTH 2 | Facility: TELEHEALTH | Age: 53
End: 2023-02-27

## 2023-03-02 ENCOUNTER — LAB OUTSIDE AN ENCOUNTER (OUTPATIENT)
Dept: URBAN - METROPOLITAN AREA TELEHEALTH 2 | Facility: TELEHEALTH | Age: 53
End: 2023-03-02

## 2023-06-01 ENCOUNTER — OFFICE VISIT (OUTPATIENT)
Dept: URBAN - METROPOLITAN AREA CLINIC 128 | Facility: CLINIC | Age: 53
End: 2023-06-01
Payer: COMMERCIAL

## 2023-06-01 VITALS
BODY MASS INDEX: 18.6 KG/M2 | SYSTOLIC BLOOD PRESSURE: 118 MMHG | TEMPERATURE: 98.1 F | HEIGHT: 69 IN | WEIGHT: 125.6 LBS | DIASTOLIC BLOOD PRESSURE: 68 MMHG | HEART RATE: 68 BPM

## 2023-06-01 DIAGNOSIS — Z12.11 COLON CANCER SCREENING: ICD-10-CM

## 2023-06-01 DIAGNOSIS — K59.01 CONSTIPATION BY DELAYED COLONIC TRANSIT: ICD-10-CM

## 2023-06-01 PROCEDURE — 99214 OFFICE O/P EST MOD 30 MIN: CPT | Performed by: PHYSICIAN ASSISTANT

## 2023-06-01 RX ORDER — FLUTICASONE FUROATE, UMECLIDINIUM BROMIDE AND VILANTEROL TRIFENATATE 100; 62.5; 25 UG/1; UG/1; UG/1
1 PUFF POWDER RESPIRATORY (INHALATION) ONCE A DAY
Status: ACTIVE | COMMUNITY

## 2023-06-01 RX ORDER — ALBUTEROL SULFATE 90 UG/1
1 PUFF AS NEEDED AEROSOL, METERED RESPIRATORY (INHALATION)
Status: ACTIVE | COMMUNITY

## 2023-06-01 RX ORDER — PLECANATIDE 3 MG/1
1 TABLET TABLET ORAL ONCE A DAY
Qty: 30 | Refills: 5 | OUTPATIENT
Start: 2023-06-01 | End: 2023-11-27

## 2023-06-01 NOTE — HPI-TODAY'S VISIT:
The patient elicits having constipation. Patient describes bowel pattern as: 1 BM every 7 days Duration of symptoms: x years It is relieved by: 1/2 box of exlax It is aggravated by: eating foods Associated symptoms: she is eating less for fear of having constipation Current stress: yes Recent dietary changes: none Previous work up, labs, imaging: none Last colonoscopy date: 2022, no specimens were collected, to be repeated in 10 years Last EGD: 2022 fundic gland polyps and gastropathy She has a history of a carrier for alpha 1 antitripsin and is under the care of Dr. Nagy our liver specialist for this. She had hepatitis C that was treated and her last hcv rna was non-detectable. She has a history of fatty liver. Last 2022 abdominal and pelvic CT scan was overall normal. She has had normal LFTs. Her latest platelet count was 404 in 03/2023. She is no longer doing alcohol and stimulants which she used to abuse. She is in sobriety for 5 years. She has a history of havig Hirshprungs as a child

## 2023-06-22 LAB
A/G RATIO: 1.9
ABSOLUTE BASOPHILS: 48
ABSOLUTE EOSINOPHILS: 248
ABSOLUTE LYMPHOCYTES: 1815
ABSOLUTE MONOCYTES: 504
ABSOLUTE NEUTROPHILS: 4285
ALBUMIN: 4.6
ALKALINE PHOSPHATASE: 45
ALT (SGPT): 11
AST (SGOT): 18
BASOPHILS: 0.7
BILIRUBIN, TOTAL: 0.5
BUN/CREATININE RATIO: (no result)
BUN: 10
CALCIUM: 9.3
CARBON DIOXIDE, TOTAL: 25
CHLORIDE: 102
CREATININE: 0.71
EGFR: 104
EOSINOPHILS: 3.6
GLOBULIN, TOTAL: 2.4
GLUCOSE: 84
HEMATOCRIT: 38.3
HEMOGLOBIN: 13.3
LYMPHOCYTES: 26.3
MCH: 35.1
MCHC: 34.7
MCV: 101.1
MONOCYTES: 7.3
MPV: 10.6
NEUTROPHILS: 62.1
PLATELET COUNT: 427
POTASSIUM: 4.7
PROTEIN, TOTAL: 7
RDW: 11.4
RED BLOOD CELL COUNT: 3.79
SODIUM: 138
TSH W/REFLEX TO FT4: 1.24
WHITE BLOOD CELL COUNT: 6.9

## 2023-07-20 ENCOUNTER — OFFICE VISIT (OUTPATIENT)
Dept: URBAN - METROPOLITAN AREA CLINIC 128 | Facility: CLINIC | Age: 53
End: 2023-07-20
Payer: COMMERCIAL

## 2023-07-20 VITALS
BODY MASS INDEX: 17.45 KG/M2 | HEIGHT: 69 IN | DIASTOLIC BLOOD PRESSURE: 60 MMHG | SYSTOLIC BLOOD PRESSURE: 110 MMHG | WEIGHT: 117.8 LBS | HEART RATE: 73 BPM | TEMPERATURE: 98.2 F

## 2023-07-20 DIAGNOSIS — Z12.11 COLON CANCER SCREENING: ICD-10-CM

## 2023-07-20 DIAGNOSIS — R63.4 LOSS OF WEIGHT: ICD-10-CM

## 2023-07-20 DIAGNOSIS — K59.01 CONSTIPATION BY DELAYED COLONIC TRANSIT: ICD-10-CM

## 2023-07-20 DIAGNOSIS — D75.839 THROMBOCYTOSIS: ICD-10-CM

## 2023-07-20 DIAGNOSIS — R63.0 LOSS OF APPETITE: ICD-10-CM

## 2023-07-20 DIAGNOSIS — R76.8 HEPATITIS C ANTIBODY TEST POSITIVE: ICD-10-CM

## 2023-07-20 PROBLEM — 79890006: Status: ACTIVE | Noted: 2023-07-20

## 2023-07-20 PROCEDURE — 99214 OFFICE O/P EST MOD 30 MIN: CPT | Performed by: PHYSICIAN ASSISTANT

## 2023-07-20 RX ORDER — PLECANATIDE 3 MG/1
1 TABLET TABLET ORAL ONCE A DAY
Qty: 30 | Refills: 5 | OUTPATIENT

## 2023-07-20 RX ORDER — ALBUTEROL SULFATE 90 UG/1
1 PUFF AS NEEDED AEROSOL, METERED RESPIRATORY (INHALATION)
Status: ACTIVE | COMMUNITY

## 2023-07-20 RX ORDER — MEGESTEROL ACETATE 125 MG/ML
5 ML SUSPENSION ORAL ONCE A DAY
Qty: 150 | OUTPATIENT
Start: 2023-07-20 | End: 2023-08-19

## 2023-07-20 RX ORDER — PLECANATIDE 3 MG/1
1 TABLET TABLET ORAL ONCE A DAY
Qty: 30 | Refills: 5 | Status: ACTIVE | COMMUNITY
Start: 2023-06-01 | End: 2023-11-27

## 2023-07-20 RX ORDER — FLUTICASONE FUROATE, UMECLIDINIUM BROMIDE AND VILANTEROL TRIFENATATE 100; 62.5; 25 UG/1; UG/1; UG/1
1 PUFF POWDER RESPIRATORY (INHALATION) ONCE A DAY
Status: ACTIVE | COMMUNITY

## 2023-07-20 NOTE — HPI-TODAY'S VISIT:
The patient is here for a follow up visit for constipation which has improved since last visit. Patient describes bowel pattern as: was  1 BM every 7 days and now she has 1 BM a day Duration of symptoms: x years It is relieved by: 1/2 box of exlax It is aggravated by: eating foods Associated symptoms: she is eating less for fear of having constipation Current stress: yes Recent dietary changes: none Previous work up, labs, imaging: none Last colonoscopy date: 2022, no specimens were collected, to be repeated in 10 years Last EGD: 2022 fundic gland polyps and gastropathy She has a history of a carrier for alpha 1 antitripsin and is under the care of Dr. Nagy our liver specialist for this. She had hepatitis C that was treated and her last hcv rna was non-detectable. She has a history of fatty liver. Last 2022 abdominal and pelvic CT scan was overall normal. She has had normal LFTs. Her latest platelet count was 404 in 03/2023. She is no longer doing alcohol and stimulants which she used to abuse. She is in sobriety for 5 years. She has a history of havig Hirshprungs as a child Her platelets were 427, rest of labs were normal.

## 2023-07-24 ENCOUNTER — TELEPHONE ENCOUNTER (OUTPATIENT)
Dept: URBAN - METROPOLITAN AREA CLINIC 23 | Facility: CLINIC | Age: 53
End: 2023-07-24

## 2023-07-25 LAB
CA 125: 5
CA 19-9: 21
CEA: <2
TSH W/REFLEX TO FT4: 1.45

## 2023-08-01 ENCOUNTER — LAB OUTSIDE AN ENCOUNTER (OUTPATIENT)
Dept: URBAN - METROPOLITAN AREA TELEHEALTH 2 | Facility: TELEHEALTH | Age: 53
End: 2023-08-01

## 2023-08-07 ENCOUNTER — LAB OUTSIDE AN ENCOUNTER (OUTPATIENT)
Dept: URBAN - METROPOLITAN AREA CLINIC 128 | Facility: CLINIC | Age: 53
End: 2023-08-07

## 2023-08-09 ENCOUNTER — TELEPHONE ENCOUNTER (OUTPATIENT)
Dept: URBAN - METROPOLITAN AREA CLINIC 86 | Facility: CLINIC | Age: 53
End: 2023-08-09

## 2023-08-09 ENCOUNTER — OFFICE VISIT (OUTPATIENT)
Dept: URBAN - METROPOLITAN AREA CLINIC 79 | Facility: CLINIC | Age: 53
End: 2023-08-09
Payer: COMMERCIAL

## 2023-08-09 DIAGNOSIS — B18.2 CHRONIC HEPATITIS C: ICD-10-CM

## 2023-08-09 PROCEDURE — 93975 VASCULAR STUDY: CPT

## 2023-08-09 PROCEDURE — 76705 ECHO EXAM OF ABDOMEN: CPT

## 2023-08-09 NOTE — HPI-TODAY'S VISIT:
All Irizarry, August 9 ultrasound shows partially visualized pancreas unremarkable with main pancreatic duct visualized measuring 1 to 2 mm which was within normal limits. Liver was homogeneous/even in echotexture and normal echogenicity with no focal mass. Gallbladder thin-walled with no stones.  Common bile duct was 2 mm which was normal. Right kidney 9.9 cm with no hydronephrosis. Spleen normal at 8.4 cm. Liver vessels patent. Splenic vessels patent. In summary, the liver had patent vessels and with no evidence of any mass lesion or any sign of cirrhosis. Await your labs. Dr. Nagy

## 2023-09-11 ENCOUNTER — TELEPHONE ENCOUNTER (OUTPATIENT)
Dept: URBAN - METROPOLITAN AREA CLINIC 128 | Facility: CLINIC | Age: 53
End: 2023-09-11

## 2023-09-11 RX ORDER — PLECANATIDE 3 MG/1
1 TABLET TABLET ORAL ONCE A DAY
Qty: 30 | Refills: 5
End: 2024-03-09

## 2023-09-13 ENCOUNTER — TELEPHONE ENCOUNTER (OUTPATIENT)
Dept: URBAN - METROPOLITAN AREA CLINIC 86 | Facility: CLINIC | Age: 53
End: 2023-09-13

## 2023-09-13 ENCOUNTER — TELEPHONE ENCOUNTER (OUTPATIENT)
Dept: URBAN - METROPOLITAN AREA CLINIC 128 | Facility: CLINIC | Age: 53
End: 2023-09-13

## 2023-09-13 LAB
A/G RATIO: 1.9
ABSOLUTE BASOPHILS: 58
ABSOLUTE EOSINOPHILS: 307
ABSOLUTE LYMPHOCYTES: 1440
ABSOLUTE MONOCYTES: 499
ABSOLUTE NEUTROPHILS: 4096
ALBUMIN: 4.6
ALKALINE PHOSPHATASE: 44
ALT (SGPT): 9
AST (SGOT): 15
BASOPHILS: 0.9
BILIRUBIN, TOTAL: 0.3
BUN/CREATININE RATIO: (no result)
BUN: 12
CALCIUM: 9.6
CARBON DIOXIDE, TOTAL: 30
CHLORIDE: 103
CREATININE: 0.74
EGFR: 99
EOSINOPHILS: 4.8
GLOBULIN, TOTAL: 2.4
GLUCOSE: 89
HCV RNA, QUANTITATIVE: <1.18
HCV RNA, QUANTITATIVE: <15
HEMATOCRIT: 36.8
HEMOGLOBIN: 12.9
LYMPHOCYTES: 22.5
MCH: 34.8
MCHC: 35.1
MCV: 99.2
MONOCYTES: 7.8
MPV: 10.1
NEUTROPHILS: 64
PLATELET COUNT: 395
POTASSIUM: 4.7
PROTEIN, TOTAL: 7
RDW: 11
RED BLOOD CELL COUNT: 3.71
SODIUM: 140
WHITE BLOOD CELL COUNT: 6.4

## 2023-09-13 NOTE — HPI-TODAY'S VISIT:
Dear Susanne Irizarry, September 11 labs show hep C PCR remains less than 15 and not mention has been detected which is great to see. Sugar was 89 BUN of 12 creatinine 0.74 sodium 140 potassium 4.7 calcium 9.6 albumin 4.6 bilirubin 0.3 alkaline phosphatase 44 AST of 15 and ALT of 9 and these are actually lower than last time when they were AST of 18 ALT of 11. WBC was normal at 6.4 hemoglobin normal at 12.9 plate count normal at this time at 395 which was good to see. Your red blood cell count though was still low at 3.71 and was previously 3.79.  Your MCH was elevated slightly at 34.8 and this was also elevated before 35.1.  Please share with primary provider. Neutrophils 4096 and lymphocytes 1440 both normal. Please share labs again with primary provider to look at these nonspecific findings. Dr. Nagy

## 2023-09-15 ENCOUNTER — TELEPHONE ENCOUNTER (OUTPATIENT)
Dept: URBAN - METROPOLITAN AREA CLINIC 128 | Facility: CLINIC | Age: 53
End: 2023-09-15

## 2023-09-22 ENCOUNTER — OFFICE VISIT (OUTPATIENT)
Dept: URBAN - METROPOLITAN AREA TELEHEALTH 2 | Facility: TELEHEALTH | Age: 53
End: 2023-09-22
Payer: COMMERCIAL

## 2023-09-22 VITALS — HEIGHT: 69 IN | BODY MASS INDEX: 17.03 KG/M2 | WEIGHT: 115 LBS

## 2023-09-22 DIAGNOSIS — K74.00 HEPATIC FIBROSIS: ICD-10-CM

## 2023-09-22 DIAGNOSIS — E88.01 CARRIER OF ALPHA-1-ANTITRYPSIN DEFICIENCY: ICD-10-CM

## 2023-09-22 DIAGNOSIS — B18.2 CHRONIC HEPATITIS C: ICD-10-CM

## 2023-09-22 DIAGNOSIS — Z79.899 HIGH RISK MEDICATION USE: ICD-10-CM

## 2023-09-22 PROCEDURE — 99214 OFFICE O/P EST MOD 30 MIN: CPT

## 2023-09-22 RX ORDER — ALBUTEROL SULFATE 90 UG/1
1 PUFF AS NEEDED AEROSOL, METERED RESPIRATORY (INHALATION)
Status: ACTIVE | COMMUNITY

## 2023-09-22 RX ORDER — PLECANATIDE 3 MG/1
1 TABLET TABLET ORAL ONCE A DAY
Qty: 30 | Refills: 5 | Status: ACTIVE | COMMUNITY
End: 2024-03-09

## 2023-09-22 RX ORDER — FLUTICASONE FUROATE, UMECLIDINIUM BROMIDE AND VILANTEROL TRIFENATATE 100; 62.5; 25 UG/1; UG/1; UG/1
1 PUFF POWDER RESPIRATORY (INHALATION) ONCE A DAY
Status: ACTIVE | COMMUNITY

## 2023-09-22 NOTE — HPI-TODAY'S VISIT:
Pt is a 52 year old /White female, after a previous visit on Feb 2023 for a post-treatment evaluation of hepatitis C post treatment and alpha one SZ state and here for liver disease state monitoring.   A copy of the note will be sent to the referring provider.  September 11 labs show hep C PCR remains less than 15 and not mention has been detected which is great to see. Sugar was 89 BUN of 12 creatinine 0.74 sodium 140 potassium 4.7 calcium 9.6 albumin 4.6 bilirubin 0.3 alkaline phosphatase 44 AST of 15 and ALT of 9 and these are actually lower than last time when they were AST of 18 ALT of 11. WBC was normal at 6.4 hemoglobin normal at 12.9 platelet count normal at this time at 395 which was good to see. Your red blood cell count though was still low at 3.71 and was previously 3.79.  Your MCH was elevated slightly at 34.8 and this was also elevated before 35.1.  Please share with primary provider. Neutrophils 4096 and lymphocytes 1440 both normal.   August 9 ultrasound shows partially visualized pancreas unremarkable with main pancreatic duct visualized measuring 1 to 2 mm which was within normal limits. Liver was homogeneous/even in echotexture and normal echogenicity with no focal mass. Gallbladder thin-walled with no stones.  Common bile duct was 2 mm which was normal. Right kidney 9.9 cm with no hydronephrosis. Spleen normal at 8.4 cm. Liver vessels patent. Splenic vessels patent. In summary, the liver had patent vessels and with no evidence of any mass lesion or any sign of cirrhosis. Await your labs.   Feb 8 u.s: Partially visualized pancreas was unremarkable. Mildly increased echogenicity seen of the liver but without overt evidence of cirrhosis and no focal mass. Gallbladder with no stones or pericholecystic fluid. Common bile duct normal at 3 mm. Right kidney normal at 9.7 cm with no hydronephrosis. Spleen normal at 9.2 cm. Liver vessels patent. Overall the liver appears to be mildly increased in echogenicity but no overt cirrhosis or focal lesions. Normal liver Dopplers noted.  She says was prior having colon from 137 to 113 and now 115 and that explains the liver not fatty and labs lower.  On trulance for the bowels and seeing ethan Paiz for that.  January 26 labs show glucose 99 BUN of 15 creatinine 0.67 sodium 135 potassium 4.4 albumin 4.7 bili 0.7 alk phos 64 AST 14 ALT of 9.  Ideal ALT less than 25. WBC normal at 6.9 hemoglobin normal at 13.1 but MCV elevated at 100.3.  May want to check your B12 and folate levels with your primary provider. Platelet count curiously also up to 404 and was previously normal.  Have you been ill recently?  Neutrophils were normal at 4237 lymphocytes normal at 1815. She has a sinus issue. Seeing ent soon. Dr Brambila said septum. We ordered a hep C PCR but they are now many labs doing the hep C antibody first which is expected came back positive but PCR was not detected. Hep B surface antigen was noted to be negative as well. Good to check as rarely can reactivate with the immune clearance events of treatment of the hep c.  Prior she has gained weight and so that may add to the inc echogencity and so that is issue.   Has carpal tunnel in hand and using nsaids.  August 24 2022 ultrasound shows partially visualized pancreas is unremarkable. Liver was homogeneous in echotexture and normal in echogenicity with no focal mass. Gallbladder thin-walled without any stones.  Common bile duct normal at 4 mm. Right kidney 10.4 cm with no hydronephrosis.  Spleen normal 9.8 cm. Liver vessels patent. Overall they found no evidence of any focal mass lesion and a normal echogenicity to the liver.  April not been well since. She did do the egd with DR Nguyen to look at regurgitation and she had HH and some polyps. Did colon also and was ok. Trying to get linzess for her bowels.  January 19 labs show hep B non-immune status.  You should consider getting the hep B vaccine series if you have not done that yet. Could try the heplisav as failed the engerix prior. Hep C PCR remains less than 12 and not detected. Glucose 83 BUN of 12 creatinine 0.72 sodium 141 potassium 4.6 calcium 9.4 albumin 4.6 total bilirubin 0.3 alkaline phosphatase 59 AST 13 ALT of 8.  Ideal ALT is less than 25 so you are definitely there at the ideal level. White blood cell count 7.2 hemoglobin 12.8 platelet count 399 MCV 97 and neutrophils 4.5 lymphocytes 2.0.  So these labs are all stable and in normal range.  January 19 ultrasound shows visualized pancreas portions were normal. There was no focal liver abnormality and the liver was normal in echogenicity. Liver vessels were patent. Gallbladder was normal. Common bile duct normal at 2 to 3 mm and right kidney normal at 9.6 cm. Spleen normal at 10.6 cm. Overall they said that the ultrasound was a normal study.  Dec 2017 did rehab and she was in for alcohol and was there for a year and she had been using sig alcohol.  Alcohol none for 4 yrs.  2 yrs ago showed high cholesterol.  She did not do any drug use.  She had the flu and was 3 weeks to get over it and in 14 m she has 4-5 resp functions and to see Dr Brambila on March 2022.  Jan 30- 2017 labs wbc 4.6 hg 13.1 and plat 312 and mcv 100 slightly up. neutrophils 2.5 and lymphs 1.4. glu 93 and cr 0.7 and bun 11 and na 137 and k 4.5 and cl 98 and co2 23 and ca 91 and alb 46 and tb 0.6 and alk 79 and ast 22 and alt 11 and hcv pcr negative.   Jan 18 2017 u.s  pancreas unremarkable. Liver normal in size and contour. Bile ducts 1mm. Right kidney 11.1cm. Spleen 9.9cm. Vessels open.   She had covid 10days pre [place] 2021 She had vomiting and fever as issues.  Had a do a few courses of abx to get over the flu.  Mentioned at 2017 visit prior visit that she has copd and has been tx with reflumilast, advaid, spiriva, and singulair. Carries epipen as reacts to cat.   Prior in 2017 she has not been smoking 2.5 yrs. Stayed off since.  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   Jan 6 2016 wbc 5.2 hg 13.8 plat 406 little up, glu 96 cr 0.68, na 141 k 4,0, alb 4.4, tb 0.3 alk 76 ast 19 and alt 12 and hcv pcr neg.   Pt had hcv 2a/2c and tx sov and ravinder and did 3m tx ending dec 2014 and now here for ongoing care.   Plan: 1. Plan for the u.s in 6m. 2. COuld potentially if stable as alpha one sz. 3. Need at least a year look. 4. She has not reseen Dr Brambila for her lung issues. 5. She did the hep b vaccine and check next time.   Duration of visit was 30  minutes with  10 min spent prepping chart and then 20  min from 734 to 754 am by clock today as healow clock off due to internet fluxes for this telemed with time spent reviewing chart and events with pt.

## 2023-10-16 ENCOUNTER — OFFICE VISIT (OUTPATIENT)
Dept: URBAN - METROPOLITAN AREA CLINIC 128 | Facility: CLINIC | Age: 53
End: 2023-10-16
Payer: COMMERCIAL

## 2023-10-16 ENCOUNTER — DASHBOARD ENCOUNTERS (OUTPATIENT)
Age: 53
End: 2023-10-16

## 2023-10-16 VITALS
SYSTOLIC BLOOD PRESSURE: 118 MMHG | HEIGHT: 69 IN | HEART RATE: 72 BPM | BODY MASS INDEX: 17.6 KG/M2 | TEMPERATURE: 97.2 F | DIASTOLIC BLOOD PRESSURE: 66 MMHG | WEIGHT: 118.8 LBS

## 2023-10-16 DIAGNOSIS — R63.0 LOSS OF APPETITE: ICD-10-CM

## 2023-10-16 DIAGNOSIS — R76.8 HEPATITIS C ANTIBODY TEST POSITIVE: ICD-10-CM

## 2023-10-16 DIAGNOSIS — Z12.11 COLON CANCER SCREENING: ICD-10-CM

## 2023-10-16 DIAGNOSIS — R63.4 LOSS OF WEIGHT: ICD-10-CM

## 2023-10-16 DIAGNOSIS — D75.839 THROMBOCYTOSIS: ICD-10-CM

## 2023-10-16 DIAGNOSIS — K59.01 CONSTIPATION BY DELAYED COLONIC TRANSIT: ICD-10-CM

## 2023-10-16 DIAGNOSIS — K76.0 FATTY LIVER: ICD-10-CM

## 2023-10-16 PROCEDURE — 99214 OFFICE O/P EST MOD 30 MIN: CPT | Performed by: PHYSICIAN ASSISTANT

## 2023-10-16 RX ORDER — PLECANATIDE 3 MG/1
1 TABLET TABLET ORAL ONCE A DAY
Qty: 30 | Refills: 5 | Status: ACTIVE | COMMUNITY
End: 2024-03-09

## 2023-10-16 RX ORDER — PLECANATIDE 3 MG/1
1 TABLET TABLET ORAL ONCE A DAY
Qty: 30 | Refills: 5 | OUTPATIENT

## 2023-10-16 RX ORDER — ALBUTEROL SULFATE 90 UG/1
1 PUFF AS NEEDED AEROSOL, METERED RESPIRATORY (INHALATION)
Status: ACTIVE | COMMUNITY

## 2023-10-16 RX ORDER — FLUTICASONE FUROATE, UMECLIDINIUM BROMIDE AND VILANTEROL TRIFENATATE 100; 62.5; 25 UG/1; UG/1; UG/1
1 PUFF POWDER RESPIRATORY (INHALATION) ONCE A DAY
Status: ACTIVE | COMMUNITY

## 2023-10-16 NOTE — HPI-TODAY'S VISIT:
The patient is here for a follow up visit for constipation which has improved since last visit. Patient describes bowel pattern as: was  1 BM every 7 days and now she has 1 BM a day Duration of symptoms: x years It is relieved by: 1/2 box of exlax It is aggravated by: eating foods Associated symptoms: she is eating less for fear of having constipation Current stress: yes Recent dietary changes: none Previous work up, labs, imaging: none Last colonoscopy date: 2022, no specimens were collected, to be repeated in 10 years Last EGD: 2022 fundic gland polyps and gastropathy She has a history of a carrier for alpha 1 antitripsin and is under the care of Dr. Nagy our liver specialist for this. She had hepatitis C that was treated and her last hcv rna was non-detectable. She has a history of fatty liver. Last 2022 abdominal and pelvic CT scan was overall normal. She has had normal LFTs. Her latest platelet count was 404 in 03/2023. She is no longer doing alcohol and stimulants which she used to abuse. She is in sobriety for 5 years. She has a history of havig Hirshprungs as a child Her platelets are now normal Her 8/2023 abdominal and pelvic CT scan revealed  1. Two left ovarian cystic lesions, the largest of which measures 3.5        cm. A follow-up pelvic ultrasound is recommended in 3 months to assure        stability/resolution.        2. No evidence of malignancy or acute thoracic/intra-abdominal        abnormality.        3.  Hepatic steatosis and enlargement.

## 2024-03-01 ENCOUNTER — LAB (OUTPATIENT)
Dept: URBAN - METROPOLITAN AREA TELEHEALTH 2 | Facility: TELEHEALTH | Age: 54
End: 2024-03-01

## 2024-06-07 ENCOUNTER — OFFICE VISIT (OUTPATIENT)
Dept: URBAN - METROPOLITAN AREA CLINIC 128 | Facility: CLINIC | Age: 54
End: 2024-06-07
Payer: COMMERCIAL

## 2024-06-07 VITALS
HEART RATE: 67 BPM | DIASTOLIC BLOOD PRESSURE: 80 MMHG | BODY MASS INDEX: 18.51 KG/M2 | WEIGHT: 125 LBS | SYSTOLIC BLOOD PRESSURE: 122 MMHG | TEMPERATURE: 97.8 F | HEIGHT: 69 IN

## 2024-06-07 DIAGNOSIS — R63.0 LOSS OF APPETITE: ICD-10-CM

## 2024-06-07 DIAGNOSIS — D75.839 THROMBOCYTOSIS: ICD-10-CM

## 2024-06-07 DIAGNOSIS — K76.0 FATTY LIVER: ICD-10-CM

## 2024-06-07 DIAGNOSIS — R76.8 HEPATITIS C ANTIBODY TEST POSITIVE: ICD-10-CM

## 2024-06-07 DIAGNOSIS — Z12.11 COLON CANCER SCREENING: ICD-10-CM

## 2024-06-07 DIAGNOSIS — K59.01 CONSTIPATION BY DELAYED COLONIC TRANSIT: ICD-10-CM

## 2024-06-07 DIAGNOSIS — R63.4 LOSS OF WEIGHT: ICD-10-CM

## 2024-06-07 PROCEDURE — 99214 OFFICE O/P EST MOD 30 MIN: CPT | Performed by: PHYSICIAN ASSISTANT

## 2024-06-07 RX ORDER — PLECANATIDE 3 MG/1
1 TABLET TABLET ORAL ONCE A DAY
Qty: 30 | Refills: 5 | Status: ACTIVE | COMMUNITY

## 2024-06-07 RX ORDER — FLUTICASONE FUROATE, UMECLIDINIUM BROMIDE AND VILANTEROL TRIFENATATE 100; 62.5; 25 UG/1; UG/1; UG/1
1 PUFF POWDER RESPIRATORY (INHALATION) ONCE A DAY
Status: ACTIVE | COMMUNITY

## 2024-06-07 RX ORDER — LINACLOTIDE 145 UG/1
1 CAPSULE AT LEAST 30 MINUTES BEFORE THE FIRST MEAL OF THE DAY ON AN EMPTY STOMACH CAPSULE, GELATIN COATED ORAL ONCE A DAY
Qty: 90 | Refills: 3 | OUTPATIENT
Start: 2024-06-07 | End: 2025-06-02

## 2024-06-07 RX ORDER — LACTULOSE 10 G/15ML
15 ML SOLUTION ORAL ONCE A DAY
Qty: 450 ML | Refills: 1 | Status: ACTIVE | COMMUNITY
Start: 2024-04-23 | End: 2024-06-22

## 2024-06-07 RX ORDER — ALBUTEROL SULFATE 90 UG/1
1 PUFF AS NEEDED AEROSOL, METERED RESPIRATORY (INHALATION)
Status: ACTIVE | COMMUNITY

## 2024-06-07 NOTE — HPI-TODAY'S VISIT:
The patient is here for a follow up visit for constipation which has improved since last visit however, she feels she is building a tolerance to trulance and lactulose and it is not helping as much Patient describes bowel pattern as: was  1 BM every 7 days and now she has 1 BM a day Duration of symptoms: x years It is relieved by: 1/2 box of exlax It is aggravated by: eating foods Associated symptoms: she is eating less for fear of having constipation Current stress: yes Recent dietary changes: none Previous work up, labs, imaging: none Last colonoscopy date: 2022, no specimens were collected, to be repeated in 10 years Last EGD: 2022 fundic gland polyps and gastropathy She has a history of a carrier for alpha 1 antitripsin and is under the care of Dr. Nagy our liver specialist for this. She had hepatitis C that was treated and her last hcv rna was non-detectable. She has a history of fatty liver. Last 2022 abdominal and pelvic CT scan was overall normal. She has had normal LFTs. Her latest platelet count was 404 in 03/2023. She is no longer doing alcohol and stimulants which she used to abuse. She is in sobriety for 5 years. She has a history of havig Hirshprungs as a child Her platelets are now normal Her 8/2023 abdominal and pelvic CT scan revealed  1. Two left ovarian cystic lesions, the largest of which measures 3.5        cm. A follow-up pelvic ultrasound is recommended in 3 months to assure        stability/resolution.        2. No evidence of malignancy or acute thoracic/intra-abdominal        abnormality.        3.  Hepatic steatosis and enlargement.

## 2024-06-11 ENCOUNTER — TELEPHONE ENCOUNTER (OUTPATIENT)
Dept: URBAN - METROPOLITAN AREA CLINIC 128 | Facility: CLINIC | Age: 54
End: 2024-06-11

## 2024-06-12 ENCOUNTER — P2P PATIENT RECORD (OUTPATIENT)
Age: 54
End: 2024-06-12

## 2024-06-12 LAB — TSH W/REFLEX TO FT4: 1.31

## 2024-06-13 ENCOUNTER — TELEPHONE ENCOUNTER (OUTPATIENT)
Dept: URBAN - METROPOLITAN AREA CLINIC 128 | Facility: CLINIC | Age: 54
End: 2024-06-13

## 2024-12-05 ENCOUNTER — OFFICE VISIT (OUTPATIENT)
Dept: URBAN - METROPOLITAN AREA CLINIC 128 | Facility: CLINIC | Age: 54
End: 2024-12-05
Payer: COMMERCIAL

## 2024-12-05 VITALS
SYSTOLIC BLOOD PRESSURE: 120 MMHG | HEIGHT: 69 IN | BODY MASS INDEX: 19.26 KG/M2 | TEMPERATURE: 97.7 F | HEART RATE: 92 BPM | WEIGHT: 130 LBS | DIASTOLIC BLOOD PRESSURE: 80 MMHG | OXYGEN SATURATION: 98 %

## 2024-12-05 DIAGNOSIS — R63.0 LOSS OF APPETITE: ICD-10-CM

## 2024-12-05 DIAGNOSIS — K59.01 CONSTIPATION BY DELAYED COLONIC TRANSIT: ICD-10-CM

## 2024-12-05 DIAGNOSIS — K76.0 FATTY LIVER: ICD-10-CM

## 2024-12-05 DIAGNOSIS — Z12.11 COLON CANCER SCREENING: ICD-10-CM

## 2024-12-05 DIAGNOSIS — R76.8 HEPATITIS C ANTIBODY TEST POSITIVE: ICD-10-CM

## 2024-12-05 DIAGNOSIS — R63.4 LOSS OF WEIGHT: ICD-10-CM

## 2024-12-05 DIAGNOSIS — D75.839 THROMBOCYTOSIS: ICD-10-CM

## 2024-12-05 PROCEDURE — 99213 OFFICE O/P EST LOW 20 MIN: CPT | Performed by: PHYSICIAN ASSISTANT

## 2024-12-05 RX ORDER — PLECANATIDE 3 MG/1
1 TABLET TABLET ORAL ONCE A DAY
Qty: 30 | Refills: 5 | Status: DISCONTINUED | COMMUNITY

## 2024-12-05 RX ORDER — LINACLOTIDE 145 UG/1
1 CAPSULE AT LEAST 30 MINUTES BEFORE THE FIRST MEAL OF THE DAY ON AN EMPTY STOMACH CAPSULE, GELATIN COATED ORAL ONCE A DAY
Qty: 90 | Refills: 3 | COMMUNITY
Start: 2024-06-07 | End: 2025-06-02

## 2024-12-05 RX ORDER — ALBUTEROL SULFATE 90 UG/1
1 PUFF AS NEEDED AEROSOL, METERED RESPIRATORY (INHALATION)
COMMUNITY

## 2024-12-05 RX ORDER — FLUTICASONE FUROATE, UMECLIDINIUM BROMIDE AND VILANTEROL TRIFENATATE 100; 62.5; 25 UG/1; UG/1; UG/1
1 PUFF POWDER RESPIRATORY (INHALATION) ONCE A DAY
COMMUNITY

## 2024-12-05 RX ORDER — LINACLOTIDE 145 UG/1
1 CAPSULE AT LEAST 30 MINUTES BEFORE THE FIRST MEAL OF THE DAY ON AN EMPTY STOMACH CAPSULE, GELATIN COATED ORAL ONCE A DAY
Qty: 90 | Refills: 3 | OUTPATIENT

## 2024-12-05 NOTE — HPI-TODAY'S VISIT:
The patient is here for a follow up visit for constipation which has improved since lastand has 1 BM a day with linzess Duration of symptoms: x years Associated symptoms: she is eating less for fear of having constipation Current stress: yes Recent dietary changes: none Previous work up, labs, imaging: none Last colonoscopy date: 2022, no specimens were collected, to be repeated in 10 years Last EGD: 2022 fundic gland polyps and gastropathy She has a history of a carrier for alpha 1 antitripsin and is under the care of Dr. Nagy our liver specialist for this. She had hepatitis C that was treated and her last hcv rna was non-detectable. She has a history of fatty liver. Last 2022 abdominal and pelvic CT scan was overall normal. She has had normal LFTs. Her latest platelet count was 404 in 03/2023. She is no longer doing alcohol and stimulants which she used to abuse. She is in sobriety for 5 years. She has a history of having Hirshprungs as a child Her platelets are now normal Her 8/2023 abdominal and pelvic CT scan revealed  1. Two left ovarian cystic lesions, the largest of which measures 3.5        cm. A follow-up pelvic ultrasound is recommended in 3 months to assure        stability/resolution.        2. No evidence of malignancy or acute thoracic/intra-abdominal        abnormality.        3.  Hepatic steatosis and enlargement.

## 2024-12-06 ENCOUNTER — TELEPHONE ENCOUNTER (OUTPATIENT)
Dept: URBAN - METROPOLITAN AREA CLINIC 128 | Facility: CLINIC | Age: 54
End: 2024-12-06